# Patient Record
Sex: FEMALE | Race: WHITE | HISPANIC OR LATINO | Employment: PART TIME | URBAN - NONMETROPOLITAN AREA
[De-identification: names, ages, dates, MRNs, and addresses within clinical notes are randomized per-mention and may not be internally consistent; named-entity substitution may affect disease eponyms.]

---

## 2022-09-16 ENCOUNTER — OFFICE VISIT (OUTPATIENT)
Dept: FAMILY MEDICINE CLINIC | Facility: CLINIC | Age: 22
End: 2022-09-16

## 2022-09-16 ENCOUNTER — TELEPHONE (OUTPATIENT)
Dept: FAMILY MEDICINE CLINIC | Facility: CLINIC | Age: 22
End: 2022-09-16

## 2022-09-16 VITALS
TEMPERATURE: 98.4 F | OXYGEN SATURATION: 97 % | BODY MASS INDEX: 22.67 KG/M2 | RESPIRATION RATE: 16 BRPM | SYSTOLIC BLOOD PRESSURE: 123 MMHG | DIASTOLIC BLOOD PRESSURE: 81 MMHG | WEIGHT: 132.8 LBS | HEIGHT: 64 IN | HEART RATE: 78 BPM

## 2022-09-16 DIAGNOSIS — L02.91 ABSCESS: Primary | ICD-10-CM

## 2022-09-16 PROCEDURE — 99202 OFFICE O/P NEW SF 15 MIN: CPT | Performed by: NURSE PRACTITIONER

## 2022-09-16 RX ORDER — DOXYCYCLINE HYCLATE 100 MG/1
100 CAPSULE ORAL 2 TIMES DAILY
Qty: 20 CAPSULE | Refills: 0 | Status: SHIPPED | OUTPATIENT
Start: 2022-09-16 | End: 2022-09-26

## 2022-09-16 RX ORDER — SULFAMETHOXAZOLE AND TRIMETHOPRIM 800; 160 MG/1; MG/1
1 TABLET ORAL 2 TIMES DAILY
Qty: 20 TABLET | Refills: 0 | Status: SHIPPED | OUTPATIENT
Start: 2022-09-16 | End: 2022-09-16 | Stop reason: SINTOL

## 2022-09-16 NOTE — TELEPHONE ENCOUNTER
Patient called stating that she has taken one dose of bactrim DS this morning and she is already having upset stomach and diarrhea.  Patient instructed to stop taking bactrim and order placed for doxycyline.  Patient expressed understanding.

## 2022-09-16 NOTE — PROGRESS NOTES
"Chief Complaint  Cyst (Swollen, painful, hard.  Noticed on Tuesday.)    Subjective        Valeria Oconnell presents to [facility]  History of Present Illness    Lump on right labia, noticied it Tuesday.  The lump is warm, red, tender, swollen, hard.  Body has felt warm overall last few days, uncertain of fever.  Patient is sexually active and uses condoms.  Denies burning with urination, rash, itching, vaginal discharge.      Objective   Vital Signs:  /81 (BP Location: Right arm, Patient Position: Sitting, Cuff Size: Adult)   Pulse 78   Temp 98.4 °F (36.9 °C) (Infrared)   Resp 16   Ht 162.6 cm (64\")   Wt 60.2 kg (132 lb 12.8 oz)   SpO2 97%   BMI 22.80 kg/m²   Estimated body mass index is 22.8 kg/m² as calculated from the following:    Height as of this encounter: 162.6 cm (64\").    Weight as of this encounter: 60.2 kg (132 lb 12.8 oz).    BMI is within normal parameters. No other follow-up for BMI required.      Physical Exam  Exam conducted with a chaperone present (REBECCA Valadez).   Constitutional:       Appearance: Normal appearance.   HENT:      Head: Normocephalic and atraumatic.      Nose: Nose normal.      Mouth/Throat:      Mouth: Mucous membranes are moist.   Eyes:      Extraocular Movements: Extraocular movements intact.      Pupils: Pupils are equal, round, and reactive to light.   Pulmonary:      Effort: Pulmonary effort is normal.   Genitourinary:     Labia:         Right: Tenderness present.        Musculoskeletal:         General: Normal range of motion.   Skin:     General: Skin is warm and dry.      Capillary Refill: Capillary refill takes 2 to 3 seconds.   Neurological:      General: No focal deficit present.      Mental Status: She is alert and oriented to person, place, and time.   Psychiatric:         Mood and Affect: Mood normal.         Behavior: Behavior normal.        Result Review :                Assessment and Plan   Diagnoses and all orders for " this visit:    1. Abscess (Primary)  -     sulfamethoxazole-trimethoprim (Bactrim DS) 800-160 MG per tablet; Take 1 tablet by mouth 2 (Two) Times a Day for 10 days.  Dispense: 20 tablet; Refill: 0  - Patient advised that if abscess does not improve with or worsens while on antibiotic treatment, should seek either seek emergent GYN care or proceed to nearest ER for further evaluation and treatment.              Follow Up   Return if symptoms worsen or fail to improve, for Annual physical.  Patient was given instructions and counseling regarding her condition or for health maintenance advice. Please see specific information pulled into the AVS if appropriate.